# Patient Record
Sex: MALE | Race: WHITE | Employment: FULL TIME | URBAN - METROPOLITAN AREA
[De-identification: names, ages, dates, MRNs, and addresses within clinical notes are randomized per-mention and may not be internally consistent; named-entity substitution may affect disease eponyms.]

---

## 2020-08-25 ENCOUNTER — CLINICAL SUPPORT (OUTPATIENT)
Dept: SURGERY | Age: 50
End: 2020-08-25

## 2020-08-25 VITALS — WEIGHT: 280 LBS | TEMPERATURE: 98.3 F | HEIGHT: 70 IN | BODY MASS INDEX: 40.09 KG/M2

## 2020-08-25 DIAGNOSIS — Z12.11 SPECIAL SCREENING FOR MALIGNANT NEOPLASM OF COLON: Primary | ICD-10-CM

## 2020-08-25 NOTE — PROGRESS NOTES
Alisa Celeste is a 45 y.o. male presenting for Colonoscopy Evaluation    initial.     Patient has history of polyps? NO    Family history of colon cancer? NO    Patient is scheduled for 09/18/202 [place]. Prep reviewed and patient verbalized understanding.

## 2020-09-09 RX ORDER — LISINOPRIL 10 MG/1
TABLET ORAL DAILY
COMMUNITY
End: 2021-11-01

## 2020-09-09 RX ORDER — ESOMEPRAZOLE MAGNESIUM 40 MG/1
CAPSULE, DELAYED RELEASE ORAL DAILY
COMMUNITY
End: 2020-10-28

## 2020-09-18 ENCOUNTER — ANESTHESIA EVENT (OUTPATIENT)
Dept: ENDOSCOPY | Age: 50
End: 2020-09-18
Payer: COMMERCIAL

## 2020-09-18 ENCOUNTER — HOSPITAL ENCOUNTER (OUTPATIENT)
Age: 50
Setting detail: OUTPATIENT SURGERY
Discharge: HOME | End: 2020-09-18
Attending: SURGERY | Admitting: SURGERY
Payer: COMMERCIAL

## 2020-09-18 ENCOUNTER — ANESTHESIA (OUTPATIENT)
Dept: ENDOSCOPY | Age: 50
End: 2020-09-18
Payer: COMMERCIAL

## 2020-09-18 VITALS
WEIGHT: 274 LBS | OXYGEN SATURATION: 96 % | HEART RATE: 70 BPM | HEIGHT: 69 IN | SYSTOLIC BLOOD PRESSURE: 153 MMHG | TEMPERATURE: 97.3 F | RESPIRATION RATE: 18 BRPM | DIASTOLIC BLOOD PRESSURE: 90 MMHG | BODY MASS INDEX: 40.58 KG/M2

## 2020-09-18 PROCEDURE — 74011250636 HC RX REV CODE- 250/636: Performed by: NURSE ANESTHETIST, CERTIFIED REGISTERED

## 2020-09-18 PROCEDURE — 77030013995 HC SNR POLYP ENDOSC OCOA -A: Performed by: SURGERY

## 2020-09-18 PROCEDURE — 74011000250 HC RX REV CODE- 250: Performed by: NURSE ANESTHETIST, CERTIFIED REGISTERED

## 2020-09-18 PROCEDURE — 77030037186 HC VLV ENDOSC STRL DEFENDO DISP MVAT -A: Performed by: SURGERY

## 2020-09-18 PROCEDURE — 2709999900 HC NON-CHARGEABLE SUPPLY: Performed by: SURGERY

## 2020-09-18 PROCEDURE — 76040000019: Performed by: SURGERY

## 2020-09-18 PROCEDURE — 76060000031 HC ANESTHESIA FIRST 0.5 HR: Performed by: SURGERY

## 2020-09-18 PROCEDURE — 88305 TISSUE EXAM BY PATHOLOGIST: CPT

## 2020-09-18 RX ORDER — LIDOCAINE HYDROCHLORIDE 20 MG/ML
INJECTION, SOLUTION EPIDURAL; INFILTRATION; INTRACAUDAL; PERINEURAL AS NEEDED
Status: DISCONTINUED | OUTPATIENT
Start: 2020-09-18 | End: 2020-09-18 | Stop reason: HOSPADM

## 2020-09-18 RX ORDER — SODIUM CHLORIDE 0.9 % (FLUSH) 0.9 %
5-40 SYRINGE (ML) INJECTION EVERY 8 HOURS
Status: DISCONTINUED | OUTPATIENT
Start: 2020-09-18 | End: 2020-09-18 | Stop reason: HOSPADM

## 2020-09-18 RX ORDER — SODIUM CHLORIDE, SODIUM LACTATE, POTASSIUM CHLORIDE, CALCIUM CHLORIDE 600; 310; 30; 20 MG/100ML; MG/100ML; MG/100ML; MG/100ML
INJECTION, SOLUTION INTRAVENOUS
Status: DISCONTINUED | OUTPATIENT
Start: 2020-09-18 | End: 2020-09-18 | Stop reason: HOSPADM

## 2020-09-18 RX ORDER — SODIUM CHLORIDE, SODIUM LACTATE, POTASSIUM CHLORIDE, CALCIUM CHLORIDE 600; 310; 30; 20 MG/100ML; MG/100ML; MG/100ML; MG/100ML
25 INJECTION, SOLUTION INTRAVENOUS CONTINUOUS
Status: DISCONTINUED | OUTPATIENT
Start: 2020-09-18 | End: 2020-09-18 | Stop reason: HOSPADM

## 2020-09-18 RX ORDER — SODIUM CHLORIDE 0.9 % (FLUSH) 0.9 %
5-40 SYRINGE (ML) INJECTION AS NEEDED
Status: DISCONTINUED | OUTPATIENT
Start: 2020-09-18 | End: 2020-09-18 | Stop reason: HOSPADM

## 2020-09-18 RX ORDER — PROPOFOL 10 MG/ML
INJECTION, EMULSION INTRAVENOUS AS NEEDED
Status: DISCONTINUED | OUTPATIENT
Start: 2020-09-18 | End: 2020-09-18 | Stop reason: HOSPADM

## 2020-09-18 RX ORDER — LABETALOL HCL 20 MG/4 ML
SYRINGE (ML) INTRAVENOUS AS NEEDED
Status: DISCONTINUED | OUTPATIENT
Start: 2020-09-18 | End: 2020-09-18 | Stop reason: HOSPADM

## 2020-09-18 RX ORDER — ONDANSETRON 2 MG/ML
4 INJECTION INTRAMUSCULAR; INTRAVENOUS
Status: DISCONTINUED | OUTPATIENT
Start: 2020-09-18 | End: 2020-09-18 | Stop reason: HOSPADM

## 2020-09-18 RX ADMIN — SODIUM CHLORIDE, POTASSIUM CHLORIDE, SODIUM LACTATE AND CALCIUM CHLORIDE 25 ML/HR: 600; 310; 30; 20 INJECTION, SOLUTION INTRAVENOUS at 07:12

## 2020-09-18 RX ADMIN — PROPOFOL 100 MG: 10 INJECTION, EMULSION INTRAVENOUS at 07:34

## 2020-09-18 RX ADMIN — PROPOFOL 50 MG: 10 INJECTION, EMULSION INTRAVENOUS at 07:36

## 2020-09-18 RX ADMIN — PROPOFOL 50 MG: 10 INJECTION, EMULSION INTRAVENOUS at 07:38

## 2020-09-18 RX ADMIN — PROPOFOL 50 MG: 10 INJECTION, EMULSION INTRAVENOUS at 07:52

## 2020-09-18 RX ADMIN — PROPOFOL 50 MG: 10 INJECTION, EMULSION INTRAVENOUS at 07:44

## 2020-09-18 RX ADMIN — PROPOFOL 50 MG: 10 INJECTION, EMULSION INTRAVENOUS at 07:48

## 2020-09-18 RX ADMIN — LIDOCAINE HYDROCHLORIDE 60 MG: 20 INJECTION, SOLUTION EPIDURAL; INFILTRATION; INTRACAUDAL; PERINEURAL at 07:33

## 2020-09-18 RX ADMIN — LABETALOL HYDROCHLORIDE 10 MG: 5 INJECTION, SOLUTION INTRAVENOUS at 07:50

## 2020-09-18 RX ADMIN — PROPOFOL 50 MG: 10 INJECTION, EMULSION INTRAVENOUS at 07:40

## 2020-09-18 RX ADMIN — SODIUM CHLORIDE, POTASSIUM CHLORIDE, SODIUM LACTATE AND CALCIUM CHLORIDE: 600; 310; 30; 20 INJECTION, SOLUTION INTRAVENOUS at 07:30

## 2020-09-18 NOTE — OP NOTES
COLONOSCOPY OP NOTE    Patient Name: Cinthia Cervantes: 19     : 1970     AGE: 45 y.o. Anesthesia: CRNA: Stacey Arroyo CRNA     PreOp DX: Special screening for malignant neoplasms, colon [Z12.11]     PostOp DX: Sigmoid colon Polyps   Diverticulosis,   Procedure: Colonoscopy with hot snare polypectomy. Start Time:     Cecum:     End Time:     Procedure Details: After informed consent was obtained the patient was taken to the endoscopy suite and placed in the left lateral position. MAC was administered by anesthesia and titrated to effect. A timeout procedure was performed. A digital rectal exam was performed and was normal.  The colonoscope was then inserted in the rectum and CO2 was used for insufflation. The scope was then slowly advanced through the sigmoid, descending, transverse, ascending colon and into the cecum. Several large polyps were identified in the sigmoid colon and were completely removed with hot snare polypectomy and sent for specimen. Bowel preparation was Excellent visualization of the colon throughout the procedure. The ileocecal valve was visualized. The colonoscope was then slowly withdrawn back through the entire colon carefully visualizing the lumen wall. There was evidence of diverticuli in the sigmoid  Colon without diverticulitis. The colonoscope was then retroflexed in the rectum and was normal.  The scope was then discontinued from the patient. The patient tolerated the procedure well and was sent to recovery in stable condition.       Findings: sigmoid polyps, diverticuli    Estimated Blood Loss:  None    Specimens: sigmoid polyps           Complications: None           Disposition: tolerated procedure well           Condition: Stable    Fore[place], DO

## 2020-09-18 NOTE — ANESTHESIA PREPROCEDURE EVALUATION
Relevant Problems   No relevant active problems       Anesthetic History   No history of anesthetic complications            Review of Systems / Medical History  Patient summary reviewed    Pulmonary        Sleep apnea           Neuro/Psych   Within defined limits           Cardiovascular    Hypertension                   GI/Hepatic/Renal     GERD           Endo/Other        Obesity     Other Findings              Physical Exam    Airway  Mallampati: III  TM Distance: 4 - 6 cm  Neck ROM: short neck   Mouth opening: Normal     Cardiovascular  Regular rate and rhythm,  S1 and S2 normal,  no murmur, click, rub, or gallop             Dental    Dentition: Poor dentition     Pulmonary                 Abdominal         Other Findings            Anesthetic Plan    ASA: 3  Anesthesia type: total IV anesthesia            Anesthetic plan and risks discussed with: Patient

## 2020-09-18 NOTE — ANESTHESIA POSTPROCEDURE EVALUATION
Procedure(s):  COLONOSCOPY.    total IV anesthesia    Anesthesia Post Evaluation      Multimodal analgesia: multimodal analgesia not used between 6 hours prior to anesthesia start to PACU discharge  Patient location during evaluation: bedside  Patient participation: complete - patient participated  Level of consciousness: awake  Pain score: 0  Airway patency: patent  Anesthetic complications: no  Cardiovascular status: acceptable  Respiratory status: acceptable  Hydration status: acceptable  Post anesthesia nausea and vomiting:  none      INITIAL Post-op Vital signs: BP:  136/84, HR:  80, SpO2:  95, RR: 20

## 2020-09-18 NOTE — DISCHARGE INSTRUCTIONS
Colonoscopy: What to Expect at [address] [place] [address]  After a colonoscopy, you'll stay at the clinic for 30 minutes to 1 hour until the medicines wear off. Then you can go home. Your doctor will tell you when you can eat and do your other usual activities. Your doctor will talk to you about when you'll need your next colonoscopy. Your doctor can help you decide how often you need to be checked. This will depend on the results of your test and your risk for colorectal cancer. After the test, you may be bloated or have gas pains. You may need to pass gas. If a biopsy was done or a polyp was removed, you may have streaks of blood in your stool (feces) for a few days. Problems such as heavy rectal bleeding may not occur until several weeks after the test. This isn't common. But it can happen after polyps are removed. This care sheet gives you a general idea about how long it will take for you to recover. But each person recovers at a different pace. Follow the steps below to get better as quickly as possible. How can you care for yourself at home? Activity    · Rest when you feel tired. · You can do your normal activities when it feels okay to do so. Diet    · Follow your doctor's directions for eating. · Unless your doctor has told you not to, drink plenty of fluids. This helps to replace the fluids that were lost during the colon prep. · Do not drink alcohol. Medicines    · Your doctor will tell you if and when you can restart your medicines. He or she will also give you instructions about taking any new medicines. · If you take aspirin or some other blood thinner, ask your doctor if and when to start taking it again. Make sure that you understand exactly what your doctor wants you to do. · If polyps were removed or a biopsy was done during the test, your doctor may tell you not to take aspirin or other anti-inflammatory medicines for a few days.  These include ibuprofen (Advil, Motrin) and naproxen (Aleve). Other instructions    · For your safety, do not drive or operate machinery until the medicine wears off and you can think clearly. Your doctor may tell you not to drive or operate machinery until the day after your test.     · Do not sign legal documents or make major decisions until the medicine wears off and you can think clearly. The anesthesia can make it hard for you to fully understand what you are agreeing to. Follow-up care is a key part of your treatment and safety. Be sure to make and go to all appointments, and call your doctor if you are having problems. It's also a good idea to know your test results and keep a list of the medicines you take. When should you call for help? Call 911 anytime you think you may need emergency care. For example, call if:    · You passed out (lost consciousness). · You pass maroon or bloody stools. · You have trouble breathing. Call your doctor now or seek immediate medical care if:    · You have pain that does not get better after you take pain medicine. · You are sick to your stomach or cannot drink fluids. · You have new or worse belly pain. · You have blood in your stools. · You have a fever. · You cannot pass stools or gas. Watch closely for changes in your health, and be sure to contact your doctor if you have any problems.

## 2020-09-18 NOTE — H&P
History of Present Illness  Juan Zamorano is a 45 y.o. male presents for No chief complaint on file. This is a 43-year-old male here for initial screening colonoscopy. He has no personal or family history of colon cancer. No changes to bowel habits. Review of Systems   Constitutional: Negative for chills and fever. Respiratory: Negative for hemoptysis. Cardiovascular: Negative for chest pain and palpitations. Gastrointestinal: Negative for abdominal pain, blood in stool, constipation, diarrhea, heartburn, melena, nausea and vomiting. Musculoskeletal: Negative for neck pain. Endo/Heme/Allergies: Does not bruise/bleed easily. No current facility-administered medications for this encounter.      No Known Allergies    Past Medical History:   Diagnosis Date    Hypertension     Sleep apnea        Past Surgical History:   Procedure Laterality Date    EGD      about 18 years ago       Family History   Problem Relation Age of Onset    Heart Disease Mother     Diabetes Mother     Lung Disease Father         Social History     Socioeconomic History    Marital status:      Spouse name: Not on file    Number of children: Not on file    Years of education: Not on file    Highest education level: Not on file   Occupational History    Not on file   Social Needs    Financial resource strain: Not on file    Food insecurity     Worry: Not on file     Inability: Not on file    Transportation needs     Medical: Not on file     Non-medical: Not on file   Tobacco Use    Smoking status: Never Smoker    Smokeless tobacco: Never Used   Substance and Sexual Activity    Alcohol use: Yes     Frequency: 4 or more times a week    Drug use: Never    Sexual activity: Yes   Lifestyle    Physical activity     Days per week: Not on file     Minutes per session: Not on file    Stress: Not on file   Relationships    Social connections     Talks on phone: Not on file     Gets together: Not on file Attends Protestant service: Not on file     Active member of club or organization: Not on file     Attends meetings of clubs or organizations: Not on file     Relationship status: Not on file    Intimate partner violence     Fear of current or ex partner: Not on file     Emotionally abused: Not on file     Physically abused: Not on file     Forced sexual activity: Not on file   Other Topics Concern    Not on file   Social History Narrative    Not on file       XR Results (maximum last 2): No results found for this or any previous visit. CT Results (maximum last 2): No results found for this or any previous visit. MRI Results (maximum last 2): Results from Hospital Encounter encounter on 01/22/12   MRI UPPER EXT JOINT RIGHT W/O CONT    Narrative Ordering MD: Berto Brewster MD  Signed By: Claudio Chairez MD  ** FINAL **  ---------------------------------------------------------------------  Procedure Date:  01/22/2012   Accession Number:  [id number]           Order No:   [id number]         Procedure:   MRH - UPP EXTREM RT W/JNT W/O CONT        CPT Code:   56384      Admit Diag:   ROTATOR CUFF RUPTURE              Reason:   NO PRIORS RIGHT SHOULDER PAIN  INTERPRETATION:  MR right shoulder. TECHNIQUE: Coronal, sagittal T1 and T2 fat-saturated, axial T2   fat-saturated and axial proton density images of the shoulder were   obtained without the administration of IV contrast.  INDICATIONS: Chronic progressive pain. COMPARISONS: None. FINDINGS:  Diffuse hyperintense T2, hypointense T1 and marrow signal in the   distal clavicle and acromion. Mild surrounding soft tissue edema. Possible slight widening of the [facility] [place] joint, measuring up to 5 mm. There is no evidence of [place] or Hill-Sachs lesion. The cartilaginous surfaces are intact. No joint effusion is noted. No fluid within the subacromial subdeltoid or subcoracoid bursae.   The tendinous structures of the rotator cuff to include the   supraspinatus, infraspinatus, subscapularis, and teres minor are   unremarkable. No atrophy or abnormal signal within the muscle bellies. Diffuse abnormal signal involving the superior glenoid labrum. There   is more focal linear signal undercutting the superior labrum image   10 series 4. The biceps tendon is unremarkable. IMPRESSION:  Intense edema involving the acromioclavicular joint and possible   slight widening of the [facility] joint space. Differential includes grade 1   acromioclavicular separation/injury, less likely posttraumatic   osteolysis. Infection should be considered if there is no clinical   history of trauma to this region. Findings concerning for tear of the superior glenoid labrum,   possibly SLAP type lesion. Rotator cuff tendons are intact. Thank you for this referral.       Nuclear Medicine Results (maximum last 3): No results found for this or any previous visit. US Results (maximum last 2): No results found for this or any previous visit. FRANNY Results (maximum last 2): No results found for this or any previous visit. IR Results (maximum last 2): No results found for this or any previous visit. VAS/US Results (maximum last 2): No results found for this or any previous visit. PET Results (maximum last 2): No results found for this or any previous visit. Visit Vitals  BP (!) 155/86   Pulse 76   Temp 97 °F (36.1 °C)   Resp 18   Ht 5' 9\" (1.753 m)   Wt 124.3 kg (274 lb)   SpO2 97%   BMI 40.46 kg/m²        Physical Exam  Constitutional:       Appearance: Normal appearance. He is normal weight. Eyes:      Extraocular Movements: Extraocular movements intact. Conjunctiva/sclera: Conjunctivae normal.      Pupils: Pupils are equal, round, and reactive to light. Neck:      Musculoskeletal: Normal range of motion. Pulmonary:      Effort: Pulmonary effort is normal.   Skin:     General: Skin is warm and dry. Neurological:      General: No focal deficit present.       Mental Status: He is alert and oriented to person, place, and time. Mental status is at baseline. Psychiatric:         Mood and Affect: Mood normal.         Behavior: Behavior normal.         Thought Content: Thought content normal.         Judgment: Judgment normal.         Assessment and Plan: This is a 18-year-old male here for screening colonoscopy-benefits of the procedure were reviewed with the patient. Consent was obtained.     Walt Son, DO    CC Providers:  MD Ralph Kelsey DO

## 2020-10-28 RX ORDER — ESOMEPRAZOLE MAGNESIUM 40 MG/1
CAPSULE, DELAYED RELEASE ORAL
Qty: 90 CAP | Refills: 3 | Status: SHIPPED | OUTPATIENT
Start: 2020-10-28 | End: 2020-11-13 | Stop reason: SDUPTHER

## 2020-11-13 ENCOUNTER — VIRTUAL VISIT (OUTPATIENT)
Dept: FAMILY MEDICINE CLINIC | Age: 50
End: 2020-11-13
Payer: COMMERCIAL

## 2020-11-13 DIAGNOSIS — I10 HYPERTENSION, UNSPECIFIED TYPE: Primary | ICD-10-CM

## 2020-11-13 DIAGNOSIS — K21.9 GASTROESOPHAGEAL REFLUX DISEASE WITHOUT ESOPHAGITIS: ICD-10-CM

## 2020-11-13 DIAGNOSIS — I10 HYPERTENSION, UNSPECIFIED TYPE: ICD-10-CM

## 2020-11-13 DIAGNOSIS — K21.9 GASTROESOPHAGEAL REFLUX DISEASE WITHOUT ESOPHAGITIS: Primary | ICD-10-CM

## 2020-11-13 PROCEDURE — 99442 PR PHYS/QHP TELEPHONE EVALUATION 11-20 MIN: CPT | Performed by: FAMILY MEDICINE

## 2020-11-13 RX ORDER — LISINOPRIL AND HYDROCHLOROTHIAZIDE 10; 12.5 MG/1; MG/1
TABLET ORAL
Qty: 90 TAB | Refills: 3 | OUTPATIENT
Start: 2020-11-13 | End: 2021-11-01

## 2020-11-13 RX ORDER — ESOMEPRAZOLE MAGNESIUM 40 MG/1
40 CAPSULE, DELAYED RELEASE ORAL DAILY
Qty: 90 CAP | Refills: 3 | OUTPATIENT
Start: 2020-11-13 | End: 2021-11-01

## 2020-11-13 NOTE — PROGRESS NOTES
Vipul Torres is a 45 y.o. male, evaluated via audio-only technology on 11/13/2020 for No chief complaint on file. .    Assessment & Plan: Garcia, gastroesophageal reflux disease: The patient needs a refill of his medications today. His blood pressure is excellent. Has been checked recently. He has had no blood work recently is not due at this point. He is eating well he is working well tolerating COVID-19 issues quite well he has had no falls or injuries. His bowel movements are appropriate this was a 15-minute visit with face-to-face communication. The patient was at his home I was in my office. 12  Subjective: Patient is a 41-year-old male who is seen be because he needs his blood pressure medication refilled he does check his blood pressure and has been normal.  He has had no falls or injuries his reflux disease has been stable as well he has had no rashes he is eating he is  in a stable relationship. He is tolerating the restrictions from COVID-19. His bowel movements are appropriate. No urinary tract symptoms no chest pain or shortness of breath. He did have some shortness of breath a year ago but that seemed to resolve. He denies any anxiety or depression he sleeps well at night. No urinary tract symptoms at all. Bowel movements have been appropriate. Prior to Admission medications    Medication Sig Start Date End Date Taking? Authorizing Provider   lisinopril-hydroCHLOROthiazide (PRINZIDE, ZESTORETIC) 10-12.5 mg per tablet TAKE 1 TABLET BY MOUTH EVERY DAY 11/13/20   Vini Ramos MD   esomeprazole (NEXIUM) 40 mg capsule Take 1 Cap by mouth daily. 11/13/20   Vini Ramos MD   lisinopriL (PRINIVIL, ZESTRIL) 10 mg tablet Take  by mouth daily.  Indications: high blood pressure    Provider, Historical     Patient Active Problem List   Diagnosis Code    Gastroesophageal reflux disease without esophagitis K21.9    Hypertension I10       Review of Systems   Constitutional: Negative. HENT: Negative. Eyes: Negative. Respiratory: Negative. Cardiovascular: Negative. Genitourinary: Negative. Musculoskeletal: Positive for joint pain. Skin: Negative. Neurological: Negative. Endo/Heme/Allergies: Negative. Psychiatric/Behavioral: Negative. No flowsheet data found. Suzi Flores, who was evaluated through a patient-initiated, synchronous (real-time) audio only encounter, and/or her healthcare decision maker, is aware that it is a billable service, with coverage as determined by his insurance carrier. He provided verbal consent to proceed: n/a- consent obtained within past 12 months. He has not had a related appointment within my department in the past 7 days or scheduled within the next 24 hours.       Total Time: minutes: 11-20 minutes    Dariel Case MD

## 2021-10-31 PROCEDURE — 96374 THER/PROPH/DIAG INJ IV PUSH: CPT

## 2021-10-31 PROCEDURE — 99285 EMERGENCY DEPT VISIT HI MDM: CPT

## 2021-10-31 PROCEDURE — 96375 TX/PRO/DX INJ NEW DRUG ADDON: CPT

## 2021-11-01 ENCOUNTER — HOSPITAL ENCOUNTER (EMERGENCY)
Age: 51
Discharge: HOME | End: 2021-11-01
Attending: FAMILY MEDICINE
Payer: COMMERCIAL

## 2021-11-01 ENCOUNTER — APPOINTMENT (OUTPATIENT)
Dept: CT IMAGING | Age: 51
End: 2021-11-01
Attending: FAMILY MEDICINE
Payer: COMMERCIAL

## 2021-11-01 VITALS
DIASTOLIC BLOOD PRESSURE: 76 MMHG | OXYGEN SATURATION: 99 % | BODY MASS INDEX: 41.47 KG/M2 | SYSTOLIC BLOOD PRESSURE: 145 MMHG | WEIGHT: 280 LBS | RESPIRATION RATE: 20 BRPM | TEMPERATURE: 99.4 F | HEIGHT: 69 IN | HEART RATE: 82 BPM

## 2021-11-01 DIAGNOSIS — R16.0 HEPATOMEGALY: ICD-10-CM

## 2021-11-01 DIAGNOSIS — R10.11 ABDOMINAL PAIN, RIGHT UPPER QUADRANT: Primary | ICD-10-CM

## 2021-11-01 LAB
ALBUMIN SERPL-MCNC: 3.9 G/DL (ref 3.5–4.7)
ALBUMIN/GLOB SERPL: 1.1 {RATIO}
ALP SERPL-CCNC: 69 U/L (ref 38–126)
ALT SERPL-CCNC: 71 U/L (ref 3–72)
AMPHET UR QL SCN: NEGATIVE
ANION GAP SERPL CALC-SCNC: 8 MMOL/L
APPEARANCE UR: CLEAR
AST SERPL W P-5'-P-CCNC: 35 U/L (ref 17–74)
ATRIAL RATE: 286 BPM
BARBITURATES UR QL SCN: NEGATIVE
BASOPHILS # BLD: 0.1 K/UL (ref 0–0.1)
BASOPHILS NFR BLD: 1 % (ref 0–2)
BENZODIAZ UR QL: NEGATIVE
BILIRUB SERPL-MCNC: 0.8 MG/DL (ref 0.2–1)
BILIRUB UR QL: NEGATIVE
BUN SERPL-MCNC: 13 MG/DL (ref 9–21)
BUN/CREAT SERPL: 13
CA-I BLD-MCNC: 9.1 MG/DL (ref 8.5–10.5)
CALCULATED P AXIS, ECG09: -18 DEGREES
CALCULATED R AXIS, ECG10: 39 DEGREES
CALCULATED T AXIS, ECG11: 37 DEGREES
CANNABINOIDS UR QL SCN: NEGATIVE
CHLORIDE SERPL-SCNC: 101 MMOL/L (ref 94–111)
CO2 SERPL-SCNC: 25 MMOL/L (ref 21–33)
COCAINE UR QL SCN: NEGATIVE
COLOR UR: YELLOW
CREAT SERPL-MCNC: 1 MG/DL (ref 0.8–1.5)
DIAGNOSIS, 93000: NORMAL
DIFFERENTIAL METHOD BLD: ABNORMAL
DRUG SCRN COMMENT,DRGCM: NORMAL
EOSINOPHIL # BLD: 0.3 K/UL (ref 0–0.4)
EOSINOPHIL NFR BLD: 3 % (ref 0–5)
ERYTHROCYTE [DISTWIDTH] IN BLOOD BY AUTOMATED COUNT: 13.4 % (ref 11.6–14.5)
GLOBULIN SER CALC-MCNC: 3.7 G/DL
GLUCOSE SERPL-MCNC: 126 MG/DL (ref 70–110)
GLUCOSE UR STRIP.AUTO-MCNC: NEGATIVE MG/DL
HCT VFR BLD AUTO: 41.6 % (ref 36–48)
HGB BLD-MCNC: 13.9 G/DL (ref 13–16)
HGB UR QL STRIP: NEGATIVE
IMM GRANULOCYTES # BLD AUTO: 0 K/UL (ref 0–0.04)
IMM GRANULOCYTES NFR BLD AUTO: 0 % (ref 0–0.5)
KETONES UR QL STRIP.AUTO: NEGATIVE MG/DL
LACTATE SERPL-SCNC: 1.3 MMOL/L (ref 0.5–2)
LEUKOCYTE ESTERASE UR QL STRIP.AUTO: NEGATIVE
LIPASE SERPL-CCNC: 27 U/L (ref 10–57)
LYMPHOCYTES # BLD: 2 K/UL (ref 0.9–3.6)
LYMPHOCYTES NFR BLD: 17 % (ref 21–52)
MAGNESIUM SERPL-MCNC: 1.8 MG/DL (ref 1.7–2.8)
MCH RBC QN AUTO: 31.4 PG (ref 24–34)
MCHC RBC AUTO-ENTMCNC: 33.4 G/DL (ref 31–37)
MCV RBC AUTO: 94.1 FL (ref 78–100)
METHADONE UR QL: NEGATIVE
MONOCYTES # BLD: 1.4 K/UL (ref 0.05–1.2)
MONOCYTES NFR BLD: 11 % (ref 3–10)
NEUTS SEG # BLD: 8.4 K/UL (ref 1.8–8)
NEUTS SEG NFR BLD: 68 % (ref 40–73)
NITRITE UR QL STRIP.AUTO: NEGATIVE
NRBC # BLD: 0 K/UL (ref 0–0.01)
NRBC BLD-RTO: 0 PER 100 WBC
OPIATES UR QL: NEGATIVE
OXYCODONE UR QL SCN: NEGATIVE
P-R INTERVAL, ECG05: 155 MS
PCP UR QL: NEGATIVE
PH UR STRIP: 6.5 [PH] (ref 5–8)
PLATELET # BLD AUTO: 259 K/UL (ref 135–420)
PMV BLD AUTO: 10.8 FL (ref 9.2–11.8)
POTASSIUM SERPL-SCNC: 3.5 MMOL/L (ref 3.2–5.1)
PROPOXYPH UR QL: NEGATIVE
PROT SERPL-MCNC: 7.6 G/DL (ref 6.1–8.4)
PROT UR STRIP-MCNC: NEGATIVE MG/DL
Q-T INTERVAL, ECG07: 338 MS
QRS DURATION, ECG06: 82 MS
QTC CALCULATION (BEZET), ECG08: 405 MS
RBC # BLD AUTO: 4.42 M/UL (ref 4.35–5.65)
SODIUM SERPL-SCNC: 134 MMOL/L (ref 135–145)
SP GR UR REFRACTOMETRY: 1.01 (ref 1–1.03)
TRICYCLICS UR QL: NEGATIVE
TROPONIN I SERPL-MCNC: <0.02 NG/ML (ref 0.02–0.05)
UROBILINOGEN UR QL STRIP.AUTO: 1 EU/DL (ref 0.2–1)
VENTRICULAR RATE, ECG03: 86 BPM
WBC # BLD AUTO: 12.1 K/UL (ref 4.6–13.2)

## 2021-11-01 PROCEDURE — 74177 CT ABD & PELVIS W/CONTRAST: CPT

## 2021-11-01 PROCEDURE — 83690 ASSAY OF LIPASE: CPT

## 2021-11-01 PROCEDURE — 84484 ASSAY OF TROPONIN QUANT: CPT

## 2021-11-01 PROCEDURE — 85025 COMPLETE CBC W/AUTO DIFF WBC: CPT

## 2021-11-01 PROCEDURE — 93005 ELECTROCARDIOGRAM TRACING: CPT

## 2021-11-01 PROCEDURE — 96374 THER/PROPH/DIAG INJ IV PUSH: CPT

## 2021-11-01 PROCEDURE — 81003 URINALYSIS AUTO W/O SCOPE: CPT

## 2021-11-01 PROCEDURE — 80307 DRUG TEST PRSMV CHEM ANLYZR: CPT

## 2021-11-01 PROCEDURE — 74011250636 HC RX REV CODE- 250/636: Performed by: FAMILY MEDICINE

## 2021-11-01 PROCEDURE — 83605 ASSAY OF LACTIC ACID: CPT

## 2021-11-01 PROCEDURE — 36415 COLL VENOUS BLD VENIPUNCTURE: CPT

## 2021-11-01 PROCEDURE — 83735 ASSAY OF MAGNESIUM: CPT

## 2021-11-01 PROCEDURE — 74011000636 HC RX REV CODE- 636: Performed by: FAMILY MEDICINE

## 2021-11-01 PROCEDURE — 80053 COMPREHEN METABOLIC PANEL: CPT

## 2021-11-01 PROCEDURE — 96375 TX/PRO/DX INJ NEW DRUG ADDON: CPT

## 2021-11-01 RX ORDER — DICYCLOMINE HYDROCHLORIDE 20 MG/1
20 TABLET ORAL
Qty: 15 TABLET | Refills: 0 | Status: SHIPPED | OUTPATIENT
Start: 2021-11-01

## 2021-11-01 RX ORDER — ESOMEPRAZOLE MAGNESIUM 40 MG/1
40 CAPSULE, DELAYED RELEASE ORAL DAILY
Qty: 30 CAPSULE | Refills: 0 | Status: SHIPPED | OUTPATIENT
Start: 2021-11-01 | End: 2021-11-19 | Stop reason: SDUPTHER

## 2021-11-01 RX ORDER — MORPHINE SULFATE 4 MG/ML
4 INJECTION, SOLUTION INTRAMUSCULAR; INTRAVENOUS ONCE
Status: COMPLETED | OUTPATIENT
Start: 2021-11-01 | End: 2021-11-01

## 2021-11-01 RX ORDER — LISINOPRIL AND HYDROCHLOROTHIAZIDE 10; 12.5 MG/1; MG/1
1 TABLET ORAL DAILY
Qty: 30 TABLET | Refills: 0 | Status: SHIPPED | OUTPATIENT
Start: 2021-11-01 | End: 2021-11-19 | Stop reason: SDUPTHER

## 2021-11-01 RX ORDER — ONDANSETRON 2 MG/ML
4 INJECTION INTRAMUSCULAR; INTRAVENOUS ONCE
Status: COMPLETED | OUTPATIENT
Start: 2021-11-01 | End: 2021-11-01

## 2021-11-01 RX ORDER — IBUPROFEN 800 MG/1
TABLET ORAL
COMMUNITY
Start: 2021-10-05

## 2021-11-01 RX ORDER — METHOCARBAMOL 750 MG/1
750 TABLET, FILM COATED ORAL 4 TIMES DAILY
COMMUNITY

## 2021-11-01 RX ADMIN — MORPHINE SULFATE 4 MG: 4 INJECTION, SOLUTION INTRAMUSCULAR; INTRAVENOUS at 02:20

## 2021-11-01 RX ADMIN — ONDANSETRON HYDROCHLORIDE 4 MG: 2 INJECTION, SOLUTION INTRAMUSCULAR; INTRAVENOUS at 01:49

## 2021-11-01 RX ADMIN — IOPAMIDOL 100 ML: 755 INJECTION, SOLUTION INTRAVENOUS at 02:04

## 2021-11-01 RX ADMIN — SODIUM CHLORIDE 1000 ML: 9 INJECTION, SOLUTION INTRAVENOUS at 01:41

## 2021-11-01 NOTE — ED TRIAGE NOTES
Patient comes with right upper quadrant and pain radiating to back x 3 weeks but tonight it is 10/10. + nausea no vomiting. Patient had covid in September and since was seen for pleuritic pain and given Ibuprofen 800 mg and Methocarbamol 750 mg .

## 2021-11-01 NOTE — DISCHARGE INSTRUCTIONS
Thank you for allowing us to provide you with excellent care today. We hope we addressed all of your concerns and needs. We strive to provide excellent quality care in the Emergency Department. Anything less than excellent does not meet our expectations for you. If you feel that you have not received excellent quality care or timely care, please ask to speak to the nurse manager. Please choose us in the future for your continued health care needs. The exam and treatment you received in the Emergency Department were for an urgent problem and are not intended as complete care. It is important that you follow-up with a doctor, nurse practitioner, or physician assistant to:  (1) confirm your diagnosis,  (2) re-evaluation of changes in your illness and treatment, and  (3) for ongoing care. If your symptoms become worse or you do not improve as expected and you are unable to reach your usual health care provider, you should return to the Emergency Department. We are available 24 hours a day. Take this sheet with you when you go to your follow-up visit. If you have any problem arranging the follow-up visit, contact [phone] ([phone])    Make an appointment with your Primary Care doctor for follow up of this visit. Return to the ER if you are unable to be seen in the time recommended on your discharge instructions.

## 2021-11-01 NOTE — ED PROVIDER NOTES
EMERGENCY DEPARTMENT HISTORY AND PHYSICAL EXAM      Date: 11/1/2021  Patient Name: Gonzalez Olea    History of Presenting Illness     Chief Complaint   Patient presents with    Abdominal Pain       History Provided By: Patient    HPI: Gonzalez Olea, 45 y.o. male with a past medical history significant hypertension, obesity, osteoarthritis and PIERRE presents to the ED with cc of \"gallbladder pain\". He states he has been having pain in his right upper abdomen x 3 weeks but today it has been worse, rated 10/10 on the pain scale and has been associated with nausea. He states actually he has been having intermittent random flare ups of this gallbladder pain for at least 4-5 months, it comes and goes. He does not eat a healthy diet, and he thinks that this weekend it flare up because he ate out at restaurants with his wife. He also drinks alcohol regularly. He denies any fevers, chills, cough, sore throat, CP, SOB, abdominal pain, N/V/D, constipation, dysuria, rashes, bruising, bleeding. No recent travel. His PCP just retired, and he is about to be out of his maintenance BP and GERD medications. He would like to get a new PCP referral.    There are no other complaints, changes, or physical findings at this time. PCP: Inocente Brewer MD    No current facility-administered medications on file prior to encounter. Current Outpatient Medications on File Prior to Encounter   Medication Sig Dispense Refill    methocarbamoL (ROBAXIN) 750 mg tablet Take 750 mg by mouth four (4) times daily.  ibuprofen (MOTRIN) 800 mg tablet       [DISCONTINUED] lisinopril-hydroCHLOROthiazide (PRINZIDE, ZESTORETIC) 10-12.5 mg per tablet TAKE 1 TABLET BY MOUTH EVERY DAY 90 Tab 3    [DISCONTINUED] esomeprazole (NEXIUM) 40 mg capsule Take 1 Cap by mouth daily. 90 Cap 3    [DISCONTINUED] lisinopriL (PRINIVIL, ZESTRIL) 10 mg tablet Take  by mouth daily.  Indications: high blood pressure         Past History     Past Medical History:  Past Medical History:   Diagnosis Date    GERD (gastroesophageal reflux disease)     Hypertension     Sleep apnea        Past Surgical History:  Past Surgical History:   Procedure Laterality Date    COLONOSCOPY N/A 9/18/2020    COLONOSCOPY performed by [facility] DO at [facility] END[facility]COPY    EGD      about 18 years ago       Family History:  Family History   Problem Relation Age of Onset    Heart Disease Mother     Diabetes Mother     Lung Disease Father        Social History:  Social History     Tobacco Use    Smoking status: Never Smoker    Smokeless tobacco: Never Used   Substance Use Topics    Alcohol use: Yes     Comment: occasionally    Drug use: Never       Allergies:  No Known Allergies      Review of Systems   CONSTITUTIONAL: Denies weight loss, fever and chills. HEENT: Denies changes in vision and hearing. RESPIRATORY: Denies SOB and cough. CV: Denies palpitations and CP. GI: +abdominal pain, nausea, no vomiting and diarrhea. : Denies dysuria and urinary frequency. MSK: Denies myalgia and joint pain. SKIN: Denies rash and pruritus. NEUROLOGICAL: Denies headache and syncope. PSYCHIATRIC: Denies recent changes in mood. Denies anxiety and depression. Review of Systems    Physical Exam   GENERAL: Alert and oriented x 3. No acute distress. Well-nourished. EYES: EOMI. Anicteric. HENT: Moist mucous membranes. No scleral icterus. No cervical lymphadenopathy. LUNGS: Clear to auscultation bilaterally. No accessory muscle use. CARDIOVASCULAR: Regular rate and rhythm. No murmur. No JVD. ABDOMEN: Soft, tenderness to palpation in RUQ. Non-distended. No palpable masses. EXTREMITIES: No edema. Non-tender. SKIN: No rashes or lesions. Warm. NEUROLOGIC: No focal neurological deficits. CN II-XII grossly intact, but not individually tested. PSYCHIATRIC: Cooperative. Appropriate mood and affect.     Physical Exam    Lab and Diagnostic Study Results     Labs -     Recent Results (from the past 12 hour(s))   CBC WITH AUTOMATED DIFF    Collection Time: 11/01/21 12:45 AM   Result Value Ref Range    WBC 12.1 4.6 - 13.2 K/uL    RBC 4.42 4.35 - 5.65 M/uL    HGB 13.9 13.0 - 16.0 g/dL    HCT 41.6 36.0 - 48.0 %    MCV 94.1 78.0 - 100.0 FL    MCH 31.4 24.0 - 34.0 PG    MCHC 33.4 31.0 - 37.0 g/dL    RDW 13.4 11.6 - 14.5 %    PLATELET 398 250 - 271 K/uL    MPV 10.8 9.2 - 11.8 FL    NRBC 0.0 0.0  WBC    ABSOLUTE NRBC 0.00 0.00 - 0.01 K/uL    NEUTROPHILS 68 40 - 73 %    LYMPHOCYTES 17 (L) 21 - 52 %    MONOCYTES 11 (H) 3 - 10 %    EOSINOPHILS 3 0 - 5 %    BASOPHILS 1 0 - 2 %    IMMATURE GRANULOCYTES 0 0 - 0.5 %    ABS. NEUTROPHILS 8.4 (H) 1.8 - 8.0 K/UL    ABS. LYMPHOCYTES 2.0 0.9 - 3.6 K/UL    ABS. MONOCYTES 1.4 (H) 0.05 - 1.2 K/UL    ABS. EOSINOPHILS 0.3 0.0 - 0.4 K/UL    ABS. BASOPHILS 0.1 0.0 - 0.1 K/UL    ABS. IMM. GRANS. 0.0 0.00 - 0.04 K/UL    DF AUTOMATED     METABOLIC PANEL, COMPREHENSIVE    Collection Time: 11/01/21 12:45 AM   Result Value Ref Range    Sodium 134 (L) 135 - 145 mmol/L    Potassium 3.5 3.2 - 5.1 mmol/L    Chloride 101 94 - 111 mmol/L    CO2 25 21 - 33 mmol/L    Anion gap 8 mmol/L    Glucose 126 (H) 70 - 110 mg/dL    BUN 13 9 - 21 mg/dL    Creatinine 1.00 0.8 - 1.50 mg/dL    BUN/Creatinine ratio 13      GFR est AA >60 ml/min/1.73m2    GFR est non-AA >60 ml/min/1.73m2    Calcium 9.1 8.5 - 10.5 mg/dL    Bilirubin, total 0.8 0.2 - 1.0 mg/dL    AST (SGOT) 35 17 - 74 U/L    ALT (SGPT) 71 3 - 72 U/L    Alk.  phosphatase 69 38 - 126 U/L    Protein, total 7.6 6.1 - 8.4 g/dL    Albumin 3.9 3.5 - 4.7 g/dL    Globulin 3.7 g/dL    A-G Ratio 1.1     TROPONIN I    Collection Time: 11/01/21 12:45 AM   Result Value Ref Range    Troponin-I, Qt. <0.02 (L) 0.02 - 0.05 ng/mL   LIPASE    Collection Time: 11/01/21 12:45 AM   Result Value Ref Range    Lipase 27 10 - 57 U/L   LACTIC ACID    Collection Time: 11/01/21 12:45 AM   Result Value Ref Range    Lactic acid 1.3 0.5 - 2.0 mmol/L   MAGNESIUM Collection Time: 11/01/21 12:45 AM   Result Value Ref Range    Magnesium 1.8 1.7 - 2.8 mg/dL   URINALYSIS W/ RFLX MICROSCOPIC    Collection Time: 11/01/21 12:45 AM   Result Value Ref Range    Color Yellow      Appearance Clear      Specific gravity 1.015 1.005 - 1.030      pH (UA) 6.5 5.0 - 8.0      Protein Negative Negative mg/dL    Glucose Negative Negative mg/dL    Ketone Negative Negative mg/dL    Bilirubin Negative Negative      Blood Negative Negative      Urobilinogen 1.0 0.2 - 1.0 EU/dL    Nitrites Negative Negative      Leukocyte Esterase Negative Negative     DRUG SCREEN, URINE    Collection Time: 11/01/21 12:45 AM   Result Value Ref Range    AMPHETAMINES Negative Negative      BARBITURATES Negative Negative      BENZODIAZEPINES Negative Negative      COCAINE Negative Negative      METHADONE Negative Negative      OPIATES Negative Negative      OXYCODONE SCREEN Negative Negative      PCP(PHENCYCLIDINE) Negative Negative      PROPOXYPHENE Negative Negative      THC (TH-CANNABINOL) Negative Negative      TRICYCLICS Negative Negative      Drug screen comment        This test is a screen for drugs of abuse in a medical setting only (i.e., they are unconfirmed results and as such must not be used for non-medical purposes, e.g.,employment testing, legal testing). Due to its inherent nature, false positive (FP) and false negative (FN) results may be obtained. Therefore, if necessary for medical care, recommend confirmation of positive findings by GC/MS. Radiologic Studies -   @lastxrresult@  CT Results  (Last 48 hours)               11/01/21 0212  CT ABD PELV W CONT Final result    Impression:      1. No CT findings of an acute intra-abdominal or intrapelvic obstructive or   inflammatory process. 2. Hepatomegaly and steatosis. 3. Stable L1 vertebral body compression fracture.        Narrative:  EXAM: CT of the abdomen and pelvis       INDICATION: Right upper quadrant abdominal pain, nausea COMPARISON: Lumbar spine series from 7/1/2014. TECHNIQUE: Axial CT imaging of the abdomen and pelvis was performed with   intravenous contrast. Multiplanar reformats were generated. One or more dose   reduction techniques were used on this CT: automated exposure control,   adjustment of the mAs and/or kVp according to patient size, and iterative   reconstruction techniques. The specific techniques used on this CT exam have   been documented in the patient's electronic medical record. Digital Imaging and   Communications in Medicine (DICOM) format image data are available to   nonaffiliated external healthcare facilities or entities on a secure, media   free, reciprocally searchable basis with patient authorization for at least a   12-month period after this study. _______________       FINDINGS:       LOWER CHEST: Mild bibasilar dependent changes without consolidation. Moderate   coronary artery atherosclerosis. LIVER, BILIARY: Hepatomegaly with diffuse parenchymal low attenuation/steatosis. No suspicious enhancing mass or lesion. No biliary dilation. Gallbladder is   unremarkable. PANCREAS: Unremarkable       SPLEEN: Normal.       ADRENALS: Normal.       KIDNEYS, URETERS, BLADDER: Symmetric enhancing bilateral kidneys without   hydronephrosis, perinephric fluid or induration. No hydroureter. Unremarkable   bladder. GASTROINTESTINAL TRACT: No bowel dilation or wall thickening. Normal appendix. Scattered diverticulosis without diverticulitis. LYMPH NODES: No enlarged lymph nodes. PELVIC ORGANS: Unremarkable. VASCULATURE: Unremarkable. OSSEOUS: No acute osseous abnormality. Stable L1 vertebral body compression   fracture, minimal retrolisthesis at L1-2, with degenerative vacuum disc   phenomena .        OTHER: None.       _______________               CXR Results  (Last 48 hours)    None            Medical Decision Making   - I am the first provider for this patient. - I reviewed the vital signs, available nursing notes, past medical history, past surgical history, family history and social history. - Initial assessment performed. The patients presenting problems have been discussed, and they are in agreement with the care plan formulated and outlined with them. I have encouraged them to ask questions as they arise throughout their visit. Vital Signs-Reviewed the patient's vital signs. Patient Vitals for the past 12 hrs:   Temp Pulse Resp BP SpO2   11/01/21 0327  82 20 (!) 145/76 99 %   11/01/21 0257  93 20 138/84 100 %   11/01/21 0227  89 20 138/80 99 %   11/01/21 0131  87 20 (!) 148/76 98 %   11/01/21 0101  87 22 138/74 98 %   11/01/21 0039     99 %   11/01/21 0031 99.4 °F (37.4 °C) 92 24 (!) 161/120 99 %       Records Reviewed: Nursing Notes and Old Medical Records    The patient presents with abdominal pain with a differential diagnosis of abdominal pain, appendicitis, biliary colic, cholecystitis, diverticulitis, gastritis, gastroenteritis, GERD, obstruction, pancreatitis, PUD, renal Colic and UTI      ED Course:     ED Course as of Nov 01 1211   Mon Nov 01, 2021   0157 Troponin-I, Qt.(!): <0.02 [OM]   0157 WBC: 12.1 [OM]   0157 RBC: 4.42 [OM]   0157 HGB: 13.9 [OM]   0157 HCT: 41.6 [OM]   0158 Lactic acid: 1.3 [OM]   0238 No leukocytosis or anemia. Troponin negative. Lipase normal. UA normal. CT pending.     [OM]   2126 Patient waiting to get in with new PCP Dr. Magaly Villatoro since Dr. Alfonso Valenzuela retired. Needs BP and GERD Rx refilled. Given CT with hepatomegaly will refer to GI.    [OM]      ED Course User Index  [OM] Cristian Pink,        Provider Notes (Medical Decision Making):   As above. Given months of episodic biliary colic, and the fact that some people have acalculous biliary colic, will still provide patient with General Surgery referral. He will also follow up with GI given hepatomegaly.  Discussed improved diet and weight loss, as well as alcohol restriction. BP and GERD prescriptions refilled x 1 month and patient given referral to Dr. Trav Mack for PCP. MDM       Procedures   Medical Decision Makingedical Decision Making  Performed by: Corbin Bueno DO  PROCEDURES:  Procedures       Disposition   Disposition: DC- Adult Discharges: All of the diagnostic tests were reviewed and questions answered. Diagnosis, care plan and treatment options were discussed. The patient understands the instructions and will follow up as directed. The patients results have been reviewed with them. They have been counseled regarding their diagnosis. The patient and spouse/SO verbally convey understanding and agreement of the signs, symptoms, diagnosis, treatment and prognosis and additionally agrees to follow up as recommended with their PCP in 24 - 48 hours. They also agree with the care-plan and convey that all of their questions have been answered. I have also put together some discharge instructions for them that include: 1) educational information regarding their diagnosis, 2) how to care for their diagnosis at home, as well a 3) list of reasons why they would want to return to the ED prior to their follow-up appointment, should their condition change. DC-The patient and spouse was given verbal follow-up instructions        DISCHARGE PLAN:  1. Current Discharge Medication List      CONTINUE these medications which have NOT CHANGED    Details   methocarbamoL (ROBAXIN) 750 mg tablet Take 750 mg by mouth four (4) times daily. ibuprofen (MOTRIN) 800 mg tablet       lisinopril-hydroCHLOROthiazide (PRINZIDE, ZESTORETIC) 10-12.5 mg per tablet TAKE 1 TABLET BY MOUTH EVERY DAY  Qty: 90 Tab, Refills: 3    Associated Diagnoses: Hypertension, unspecified type      esomeprazole (NEXIUM) 40 mg capsule Take 1 Cap by mouth daily. Qty: 90 Cap, Refills: 3    Associated Diagnoses: Gastroesophageal reflux disease without esophagitis           2.    Follow-up Information Follow up With Specialties Details Why Contact Info    Roger Vences MD Family Medicine Schedule an appointment as soon as possible for a visit   [id number] [address]      Sharyn Seymour MD Gastroenterology Schedule an appointment as soon as possible for a visit   [address] [place] [address] [address] [facility]      Maciej Morocho MD Surgery Schedule an appointment as soon as possible for a visit   [place] [phone]          3. Return to ED if worse   4. Discharge Medication List as of 11/1/2021  3:26 AM      START taking these medications    Details   lisinopril-hydroCHLOROthiazide (PRINZIDE, ZESTORETIC) 10-12.5 mg per tablet Take 1 Tablet by mouth daily for 30 days. , Normal, Disp-30 Tablet, R-0      esomeprazole (NexIUM) 40 mg capsule Take 1 Capsule by mouth daily for 30 days. , Normal, Disp-30 Capsule, R-0      dicyclomine (BENTYL) 20 mg tablet Take 1 Tablet by mouth every eight (8) hours as needed for Abdominal Cramps., Normal, Disp-15 Tablet, R-0         CONTINUE these medications which have NOT CHANGED    Details   methocarbamoL (ROBAXIN) 750 mg tablet Take 750 mg by mouth four (4) times daily. , Historical Med      ibuprofen (MOTRIN) 800 mg tablet Historical Med               Diagnosis     Clinical Impression:   1. Abdominal pain, right upper quadrant    2. Hepatomegaly        Attestations:    Nicole Wheeler, DO    Please note that this dictation was completed with [employer], the computer voice recognition software. Quite often unanticipated grammatical, syntax, homophones, and other interpretive errors are inadvertently transcribed by the computer software. Please disregard these errors. Please excuse any errors that have escaped final proofreading. Thank you.

## 2021-11-19 ENCOUNTER — OFFICE VISIT (OUTPATIENT)
Dept: FAMILY MEDICINE CLINIC | Age: 51
End: 2021-11-19
Payer: COMMERCIAL

## 2021-11-19 VITALS
OXYGEN SATURATION: 96 % | WEIGHT: 280 LBS | HEART RATE: 85 BPM | DIASTOLIC BLOOD PRESSURE: 85 MMHG | SYSTOLIC BLOOD PRESSURE: 139 MMHG | BODY MASS INDEX: 41.35 KG/M2

## 2021-11-19 DIAGNOSIS — E66.01 CLASS 3 SEVERE OBESITY DUE TO EXCESS CALORIES WITH SERIOUS COMORBIDITY AND BODY MASS INDEX (BMI) OF 40.0 TO 44.9 IN ADULT (HCC): ICD-10-CM

## 2021-11-19 DIAGNOSIS — I10 ESSENTIAL HYPERTENSION: ICD-10-CM

## 2021-11-19 DIAGNOSIS — K76.0 FATTY INFILTRATION OF LIVER: Primary | ICD-10-CM

## 2021-11-19 LAB — HBA1C MFR BLD HPLC: 5.7 %

## 2021-11-19 PROCEDURE — 99214 OFFICE O/P EST MOD 30 MIN: CPT | Performed by: STUDENT IN AN ORGANIZED HEALTH CARE EDUCATION/TRAINING PROGRAM

## 2021-11-19 PROCEDURE — 83036 HEMOGLOBIN GLYCOSYLATED A1C: CPT | Performed by: STUDENT IN AN ORGANIZED HEALTH CARE EDUCATION/TRAINING PROGRAM

## 2021-11-19 RX ORDER — LISINOPRIL AND HYDROCHLOROTHIAZIDE 10; 12.5 MG/1; MG/1
1 TABLET ORAL DAILY
Qty: 90 TABLET | Refills: 3 | Status: SHIPPED | OUTPATIENT
Start: 2021-11-19

## 2021-11-19 RX ORDER — ESOMEPRAZOLE MAGNESIUM 40 MG/1
40 CAPSULE, DELAYED RELEASE ORAL DAILY
Qty: 90 CAPSULE | Refills: 3 | Status: SHIPPED | OUTPATIENT
Start: 2021-11-19 | End: 2021-12-10 | Stop reason: SDUPTHER

## 2021-11-20 NOTE — PROGRESS NOTES
Subjective: Alvina North is a 45 y.o. male who was seen for Establish Care (establish care with provider was a [place] patient )    Patient was in the ER due to right upper quadrant pain. His liver ultrasound showed fatty infiltration of liver. He also has follow up with GI and has blood work ordered. He still complain of some abdominal pain but has not worsened. Home Medications    Medication Sig Start Date End Date Taking? Authorizing Provider   esomeprazole (NexIUM) 40 mg capsule Take 1 Capsule by mouth daily for 30 days. 11/19/21 12/19/21 Yes Uriel Hein MD   lisinopril-hydroCHLOROthiazide (PRINZIDE, ZESTORETIC) 10-12.5 mg per tablet Take 1 Tablet by mouth daily. 11/19/21  Yes Uriel Hein MD   ibuprofen (MOTRIN) 800 mg tablet  10/5/21  Yes Krystin, MD David   methocarbamoL (ROBAXIN) 750 mg tablet Take 750 mg by mouth four (4) times daily. Patient not taking: Reported on 11/19/2021    Other, MD David   dicyclomine (BENTYL) 20 mg tablet Take 1 Tablet by mouth every eight (8) hours as needed for Abdominal Cramps. Patient not taking: Reported on 11/19/2021 11/1/21   Chester Alcazarer, DO      No Known Allergies  Social History     Tobacco Use    Smoking status: Never Smoker    Smokeless tobacco: Never Used   Substance Use Topics    Alcohol use: Yes     Comment: occasionally    Drug use: Never            Review of Systems   Gastrointestinal: Positive for abdominal pain. All other systems reviewed and are negative.          Objective:     Visit Vitals  /85   Pulse 85   Wt 280 lb (127 kg)   SpO2 96%   BMI 41.35 kg/m²        General: alert, oriented, not in distress  Head: scalp normal, atraumatic  Eyes: pupils are equal and reactive, full and intact EOM's  Ears: patent ear canal, intact tympanic membrane  Nose: normal turbinates, no congestion or discharge  Lips/Mouth: moist lips and buccal mucosa, non-enlarged tonsils, pink throat  Neck: supple, no JVD, no lymphadenopathy, non-palpable thyroid  Chest/Lungs: clear breath sounds, no wheezing or crackles  Heart: normal rate, regular rhythm, no murmur  Abdomen: soft, non-distended, non-tender, normal bowel sounds, no organomegaly, no masses  Extremities: no focal deformities, no edema  Skin: no active skin lesions    Laboratory/Tests:  Office Visit on 11/19/2021   Component Date Value Ref Range Status    Hemoglobin A1c (POC) 11/19/2021 5.7  % Final   Admission on 11/01/2021, Discharged on 11/01/2021   Component Date Value Ref Range Status    Ventricular Rate 11/01/2021 86  BPM Final    Atrial Rate 11/01/2021 286  BPM Final    P-R Interval 11/01/2021 155  ms Final    QRS Duration 11/01/2021 82  ms Final    Q-T Interval 11/01/2021 338  ms Final    QTC Calculation (Bezet) 11/01/2021 405  ms Final    Calculated P Axis 11/01/2021 -18  degrees Final    Calculated R Axis 11/01/2021 39  degrees Final    Calculated T Axis 11/01/2021 37  degrees Final    Diagnosis 11/01/2021    Final                    Value:Sinus Rhythm  Early repolarization  Borderline ECG  No previous ECGs available    Confirmed by Maxime Meyer ([id number]) on 11/1/2021 3:40:09 PM      WBC 11/01/2021 12.1  4.6 - 13.2 K/uL Final    RBC 11/01/2021 4.42  4.35 - 5.65 M/uL Final    HGB 11/01/2021 13.9  13.0 - 16.0 g/dL Final    HCT 11/01/2021 41.6  36.0 - 48.0 % Final    MCV 11/01/2021 94.1  78.0 - 100.0 FL Final    MCH 11/01/2021 31.4  24.0 - 34.0 PG Final    MCHC 11/01/2021 33.4  31.0 - 37.0 g/dL Final    RDW 11/01/2021 13.4  11.6 - 14.5 % Final    PLATELET 12/31/3825 712  135 - 420 K/uL Final    MPV 11/01/2021 10.8  9.2 - 11.8 FL Final    NRBC 11/01/2021 0.0  0.0  WBC Final    ABSOLUTE NRBC 11/01/2021 0.00  0.00 - 0.01 K/uL Final    NEUTROPHILS 11/01/2021 68  40 - 73 % Final    LYMPHOCYTES 11/01/2021 17* 21 - 52 % Final    MONOCYTES 11/01/2021 11* 3 - 10 % Final    EOSINOPHILS 11/01/2021 3  0 - 5 % Final    BASOPHILS 11/01/2021 1  0 - 2 % Final    IMMATURE GRANULOCYTES 11/01/2021 0  0 - 0.5 % Final    ABS. NEUTROPHILS 11/01/2021 8.4* 1.8 - 8.0 K/UL Final    ABS. LYMPHOCYTES 11/01/2021 2.0  0.9 - 3.6 K/UL Final    ABS. MONOCYTES 11/01/2021 1.4* 0.05 - 1.2 K/UL Final    ABS. EOSINOPHILS 11/01/2021 0.3  0.0 - 0.4 K/UL Final    ABS. BASOPHILS 11/01/2021 0.1  0.0 - 0.1 K/UL Final    ABS. IMM. GRANS. 11/01/2021 0.0  0.00 - 0.04 K/UL Final    DF 11/01/2021 AUTOMATED    Final    Sodium 11/01/2021 134* 135 - 145 mmol/L Final    Potassium 11/01/2021 3.5  3.2 - 5.1 mmol/L Final    Chloride 11/01/2021 101  94 - 111 mmol/L Final    CO2 11/01/2021 25  21 - 33 mmol/L Final    Anion gap 11/01/2021 8  mmol/L Final    Glucose 11/01/2021 126* 70 - 110 mg/dL Final    BUN 11/01/2021 13  9 - 21 mg/dL Final    Creatinine 11/01/2021 1.00  0.8 - 1.50 mg/dL Final    BUN/Creatinine ratio 11/01/2021 13    Final    GFR est AA 11/01/2021 >60  ml/min/1.73m2 Final    GFR est non-AA 11/01/2021 >60  ml/min/1.73m2 Final    Comment: Estimated GFR is calculated using the IDMS-traceable Modification of Diet in Renal Disease (MDRD) Study equation, reported for both  Americans (GFRAA) and non- Americans (GFRNA), and normalized to 1.73m2 body surface area. The physician must decide which value applies to the patient. The MDRD study equation should only be used in individuals age 25 or older. It has not been validated for the following: pregnant women, patients with serious comorbid conditions, or on certain medications, or persons with extremes of body size, muscle mass, or nutritional status.  Calcium 11/01/2021 9.1  8.5 - 10.5 mg/dL Final    Bilirubin, total 11/01/2021 0.8  0.2 - 1.0 mg/dL Final    AST (SGOT) 11/01/2021 35  17 - 74 U/L Final    ALT (SGPT) 11/01/2021 71  3 - 72 U/L Final    Alk.  phosphatase 11/01/2021 69  38 - 126 U/L Final    Protein, total 11/01/2021 7.6  6.1 - 8.4 g/dL Final    Albumin 11/01/2021 3.9  3.5 - 4.7 g/dL Final    Globulin 11/01/2021 3.7  g/dL Final    A-G Ratio 11/01/2021 1.1    Final    Troponin-I, Qt. 11/01/2021 <0.02* 0.02 - 0.05 ng/mL Final    Lipase 11/01/2021 27  10 - 57 U/L Final    Lactic acid 11/01/2021 1.3  0.5 - 2.0 mmol/L Final    Magnesium 11/01/2021 1.8  1.7 - 2.8 mg/dL Final    Color 11/01/2021 Yellow    Final    Color Reference Range: Straw, Yellow or Dark Yellow    Appearance 11/01/2021 Clear    Final    Specific gravity 11/01/2021 1.015  1.005 - 1.030   Final    pH (UA) 11/01/2021 6.5  5.0 - 8.0   Final    Protein 11/01/2021 Negative  Negative mg/dL Final    Glucose 11/01/2021 Negative  Negative mg/dL Final    Ketone 11/01/2021 Negative  Negative mg/dL Final    Bilirubin 11/01/2021 Negative  Negative   Final    Blood 11/01/2021 Negative  Negative   Final    Urobilinogen 11/01/2021 1.0  0.2 - 1.0 EU/dL Final    Nitrites 11/01/2021 Negative  Negative   Final    Leukocyte Esterase 11/01/2021 Negative  Negative   Final    AMPHETAMINES 11/01/2021 Negative  Negative   Final    BARBITURATES 11/01/2021 Negative  Negative   Final    BENZODIAZEPINES 11/01/2021 Negative  Negative   Final    COCAINE 11/01/2021 Negative  Negative   Final    METHADONE 11/01/2021 Negative  Negative   Final    OPIATES 11/01/2021 Negative  Negative   Final    OXYCODONE SCREEN 11/01/2021 Negative  Negative   Final    PCP(PHENCYCLIDINE) 11/01/2021 Negative  Negative   Final    PROPOXYPHENE 11/01/2021 Negative  Negative   Final    THC (TH-CANNABINOL) 11/01/2021 Negative  Negative   Final    TRICYCLICS 35/97/2320 Negative  Negative   Final    Drug screen comment 11/01/2021 This test is a screen for drugs of abuse in a medical setting only (i.e., they are unconfirmed results and as such must not be used for non-medical purposes, e.g.,employment testing, legal testing). Due to its inherent nature, false positive (FP) and false negative (FN) results may be obtained.  Therefore, if necessary for medical care, recommend confirmation of positive findings by GC/MS. Final    Comment: This screening test can identify the presence of the following drugs when above the cutoff value. See list posted on the intranet. It can be viewed by selecting in sequence the from the [address] home page: 1 [facility], Resources, [facility], Physician Resources Q to [place], [place] (Urine Drug Screen Automated) List of Detectable Drugs. \" Or use web address: http://[facility]/[place]/[facility]/Physician%20Resources/UDS%20List%of%20Detectable%20Drugs. pdf The   cutoff values (i.e., the level at which this screening test becomes positive for a given drug group) are: Amphetamine/Methamphetamine: 1000 ng/mL Barbiturates:                 200 ng/mL Benzodiazepines:              200 ng/mL Cocaine:                      300 ng/mL Methadone:                    300 ng/mL Opiates:                      300 ng/mL Oxycodone:                    300 ng/mL Phencyclidine, PCP:            25 ng/mL Propoxyphene:                 300 ng/mL Marijuana, THC:                                           50   ng/mL Tricyclic Antidepressants:    300 ng/mL           Assessment & Plan:     1. Fatty infiltration of liver  Follow up with GI. A1c at 5.7. Will check lipid panel  - AMB POC HEMOGLOBIN A1C  - LIPID PANEL    2. Class 3 severe obesity due to excess calories with serious comorbidity and body mass index (BMI) of 40.0 to 44.9 in [facility])  Check lipid panel    3. Essential hypertension  Controlled. Continue prinzide             I have discussed the diagnosis with the patient and the intended plan as seen in the above orders. The patient has received an after-visit summary and questions were answered concerning future plans. I have discussed medication side effects and warnings with the patient as well. I have reviewed the plan of care with the patient, accepted their input and they are in agreement with the treatment goals.  Previous lab and imaging results were reviewed by me.        Thomas Garcia MD  November 19, 2021

## 2021-12-10 NOTE — TELEPHONE ENCOUNTER
Patient needs Esomeprazole sent to [facility]. Mikki says they didn't receive the script that was sent on 11/19.

## 2021-12-13 RX ORDER — ESOMEPRAZOLE MAGNESIUM 40 MG/1
40 CAPSULE, DELAYED RELEASE ORAL DAILY
Qty: 90 CAPSULE | Refills: 3 | Status: SHIPPED | OUTPATIENT
Start: 2021-12-13 | End: 2022-03-13

## 2024-09-26 NOTE — PROGRESS NOTES
Noni Byrd presents today for   Chief Complaint   Patient presents with   [facility] Establish Care     establish care with provider was a [place] patient        Is someone accompanying this pt? no    Is the patient using any DME equipment during OV? yes    Depression Screening:  3 most recent PHQ Screens 11/19/2021   Little interest or pleasure in doing things Not at all   Feeling down, depressed, irritable, or hopeless Not at all   Total Score PHQ 2 0       Learning Assessment:  No flowsheet data found. Fall Risk  No flowsheet data found. ADL  No flowsheet data found. Travel Screening:    Travel Screening     Question   Response    In the last month, have you been in contact with someone who was confirmed or suspected to have Coronavirus / COVID-19? No / Unsure    Have you had a COVID-19 viral test in the last 14 days? No    Do you have any of the following new or worsening symptoms? None of these    Have you traveled internationally or domestically in the last month? No      Travel History   Travel since 10/19/21    No documented travel since 10/19/21         Health Maintenance reviewed and discussed and ordered per Provider. Health Maintenance Due   Topic Date Due    Hepatitis C Screening  Never done    DTaP/Tdap/Td series (1 - Tdap) Never done    Lipid Screen  Never done    Shingrix Vaccine Age 50> (1 of 2) Never done    Flu Vaccine (1) Never done   . Coordination of Care:  1. \"Have you been to the ER, urgent care clinic since your last visit? Hospitalized since your last visit? \" Yes When: 10/31/2021    2. \"Have you seen or consulted any other health care providers outside of the [address] [place] [address] since your last visit? \" Yes Where: Dr. Nani Galeana ()     3. For patients aged 39-70: Has the patient had a colonoscopy? Yes,  satisfied with blue hyperlink     If the patient is female:    4. For patients aged 41-77: Has the patient had a mammogram within the past 2 years? No    5. For patients aged 21-65: Has the patient had a pap smear?  No [Cervical Pap Smear] : cervical Pap smear [Liquid Base] : liquid base [GC & Chlamydia via Pap] : GC & Chlamydia via Pap [Tolerated Well] : the patient tolerated the procedure well [No Complications] : there were no complications

## (undated) DEVICE — TRAP SURG QUAD PARABOLA SLOT DSGN SFTY SCRN TRAPEASE

## (undated) DEVICE — Device: Brand: DEFENDO VALVE AND CONNECTOR KIT

## (undated) DEVICE — KIT COLON W/ 1.1OZ LUB AND 2 END

## (undated) DEVICE — REM POLYHESIVE ADULT PATIENT RETURN ELECTRODE: Brand: VALLEYLAB

## (undated) DEVICE — ENDOGATOR TUBING FOR BOSTON SCIENTIFIC ENDOSTAT II PUMP, OLYMPUS OFP PUMP OR ENDO STRATUS PUMP: Brand: ENDOGATOR

## (undated) DEVICE — Device: Brand: DISPOSABLE ELECTROSURGICAL SNARE

## (undated) DEVICE — SINGLE-USE BIOPSY FORCEPS: Brand: RADIAL JAW 4

## (undated) DEVICE — TUBING INSUFFLATION CAP W/ EXT CARBON DIOX ENDO SMARTCAP